# Patient Record
Sex: FEMALE | Race: WHITE | Employment: UNEMPLOYED | URBAN - METROPOLITAN AREA
[De-identification: names, ages, dates, MRNs, and addresses within clinical notes are randomized per-mention and may not be internally consistent; named-entity substitution may affect disease eponyms.]

---
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
## 2023-02-07 ENCOUNTER — TELEPHONE (OUTPATIENT)
Dept: FAMILY MEDICINE CLINIC | Age: 18
End: 2023-02-07

## 2023-02-07 NOTE — TELEPHONE ENCOUNTER
----- Message from Izzy Gail sent at 2/7/2023  2:28 PM EST -----  Subject: Referral Request    Reason for referral request? wants vitamin deficiency and hormone test  Provider patient wants to be referred to(if known):     Provider Phone Number(if known):    Additional Information for Provider?   ---------------------------------------------------------------------------  --------------  CALL BACK INFO    [phone]; OK to leave message on voicemail  ---------------------------------------------------------------------------  --------------

## 2023-02-09 ENCOUNTER — TELEMEDICINE (OUTPATIENT)
Dept: FAMILY MEDICINE CLINIC | Age: 18
End: 2023-02-09
Payer: COMMERCIAL

## 2023-02-09 DIAGNOSIS — R53.83 OTHER FATIGUE: Primary | ICD-10-CM

## 2023-02-09 DIAGNOSIS — L70.0 ACNE VULGARIS: ICD-10-CM

## 2023-02-09 DIAGNOSIS — N92.6 MENSTRUAL PROBLEM: ICD-10-CM

## 2023-02-09 PROCEDURE — 99213 OFFICE O/P EST LOW 20 MIN: CPT | Performed by: FAMILY MEDICINE

## 2023-02-09 PROCEDURE — G8427 DOCREV CUR MEDS BY ELIG CLIN: HCPCS | Performed by: FAMILY MEDICINE

## 2023-02-09 PROCEDURE — 36415 COLL VENOUS BLD VENIPUNCTURE: CPT | Performed by: FAMILY MEDICINE

## 2023-02-09 SDOH — ECONOMIC STABILITY: FOOD INSECURITY: WITHIN THE PAST 12 MONTHS, YOU WORRIED THAT YOUR FOOD WOULD RUN OUT BEFORE YOU GOT MONEY TO BUY MORE.: NEVER TRUE

## 2023-02-09 SDOH — ECONOMIC STABILITY: HOUSING INSECURITY
IN THE LAST 12 MONTHS, WAS THERE A TIME WHEN YOU DID NOT HAVE A STEADY PLACE TO SLEEP OR SLEPT IN A SHELTER (INCLUDING NOW)?: NO

## 2023-02-09 SDOH — ECONOMIC STABILITY: INCOME INSECURITY: HOW HARD IS IT FOR YOU TO PAY FOR THE VERY BASICS LIKE FOOD, HOUSING, MEDICAL CARE, AND HEATING?: NOT HARD AT ALL

## 2023-02-09 SDOH — ECONOMIC STABILITY: FOOD INSECURITY: WITHIN THE PAST 12 MONTHS, THE FOOD YOU BOUGHT JUST DIDN'T LAST AND YOU DIDN'T HAVE MONEY TO GET MORE.: NEVER TRUE

## 2023-02-09 ASSESSMENT — PATIENT HEALTH QUESTIONNAIRE - PHQ9
SUM OF ALL RESPONSES TO PHQ9 QUESTIONS 1 & 2: 2
6. FEELING BAD ABOUT YOURSELF - OR THAT YOU ARE A FAILURE OR HAVE LET YOURSELF OR YOUR FAMILY DOWN: 0
10. IF YOU CHECKED OFF ANY PROBLEMS, HOW DIFFICULT HAVE THESE PROBLEMS MADE IT FOR YOU TO DO YOUR WORK, TAKE CARE OF THINGS AT HOME, OR GET ALONG WITH OTHER PEOPLE: 0
4. FEELING TIRED OR HAVING LITTLE ENERGY: 3
1. LITTLE INTEREST OR PLEASURE IN DOING THINGS: 1
5. POOR APPETITE OR OVEREATING: 3
SUM OF ALL RESPONSES TO PHQ QUESTIONS 1-9: 11
8. MOVING OR SPEAKING SO SLOWLY THAT OTHER PEOPLE COULD HAVE NOTICED. OR THE OPPOSITE, BEING SO FIGETY OR RESTLESS THAT YOU HAVE BEEN MOVING AROUND A LOT MORE THAN USUAL: 0
SUM OF ALL RESPONSES TO PHQ QUESTIONS 1-9: 11
9. THOUGHTS THAT YOU WOULD BE BETTER OFF DEAD, OR OF HURTING YOURSELF: 0
SUM OF ALL RESPONSES TO PHQ QUESTIONS 1-9: 11
SUM OF ALL RESPONSES TO PHQ QUESTIONS 1-9: 11
3. TROUBLE FALLING OR STAYING ASLEEP: 3
2. FEELING DOWN, DEPRESSED OR HOPELESS: 1
7. TROUBLE CONCENTRATING ON THINGS, SUCH AS READING THE NEWSPAPER OR WATCHING TELEVISION: 0

## 2023-02-09 ASSESSMENT — ENCOUNTER SYMPTOMS
VOMITING: 0
NAUSEA: 0

## 2023-02-09 NOTE — PROGRESS NOTES
Subjective:      Patient ID: Janna Quevedo is a 17 y.o. female.    Janna Quevedo, was evaluated through a synchronous (real-time) audio-video encounter. The patient (or guardian if applicable) is aware that this is a billable service, which includes applicable co-pays. This Virtual Visit was conducted with patient's (and/or legal guardian's) consent. The visit was conducted pursuant to the emergency declaration under the [employer] Act and the National Emergencies Act, 1135 waiver authority and the Coronavirus Preparedness and Response Supplemental Appropriations Act.  Patient identification was verified, and a caregiver was present when appropriate.   The patient was located at Home: [address] [address]  Provider was located at Facility (Appt Dept): [address] [place] [address], [address]  [place] [address]         Total time spent for this encounter: Not billed by time    --Madeleine Kilpatrick MD on 2/9/2023 at 3:04 PM    An electronic signature was used to authenticate this note.    Other  This is a chronic (abnormal menses, fatigue, acne) problem. The current episode started more than 1 year ago. The problem occurs intermittently. Associated symptoms include fatigue. Pertinent negatives include no nausea, urinary symptoms, vomiting or weakness. Nothing aggravates the symptoms. Treatments tried: bcp that helped with her menses but stopped medication on Nov.     Onset of menses: 13 yo   LMP: Jan 14 or so   Sexually active with one male he uses condoms     Acne:   Benzoyl peroxide and tretinoin   Not helping     Review of Systems   Constitutional:  Positive for fatigue.   Gastrointestinal:  Negative for nausea and vomiting.   Neurological:  Negative for weakness.     Objective:   Physical Exam  Vitals and nursing note reviewed.   Constitutional:       General: She is not in acute distress.     Appearance: Normal appearance. She is not ill-appearing, toxic-appearing or diaphoretic.   HENT:      Head:  Normocephalic and atraumatic. Pulmonary:      Effort: No respiratory distress. Musculoskeletal:      Cervical back: Normal range of motion. Neurological:      General: No focal deficit present. Mental Status: She is alert and oriented to person, place, and time. Mental status is at baseline. Psychiatric:         Mood and Affect: Mood normal.         Behavior: Behavior normal.       Assessment:      .   Diagnosis Orders   1. Other fatigue  Vitamin D 25 Hydroxy    Comprehensive Metabolic Panel      2. Menstrual problem  Estradiol    Progesterone    Testosterone    TSH    Comprehensive Metabolic Panel    HCG, Quantitative, Pregnancy      3. Acne vulgaris                Plan:      Sec to depression (screening PHQ-9=11)? Check labs  Check blood work,   She would like to try Accutane , she has a dermatologist , I explained to her that if she starts that treatment she will need to resume bcp.      Rudy woodn           Sayra Rios MD

## 2023-06-20 LAB
25(OH)D3 SERPL-MCNC: 62.7 NG/ML
ALBUMIN SERPL-MCNC: 4.7 G/DL (ref 3.4–5)
ALBUMIN/GLOB SERPL: 1.7 {RATIO} (ref 1.1–2.2)
ALP SERPL-CCNC: 49 U/L (ref 40–129)
ALT SERPL-CCNC: 15 U/L (ref 10–40)
ANION GAP SERPL CALCULATED.3IONS-SCNC: 13 MMOL/L (ref 3–16)
AST SERPL-CCNC: 18 U/L (ref 15–37)
B-HCG SERPL EIA 3RD IS-ACNC: <5 MIU/ML
BILIRUB SERPL-MCNC: 0.6 MG/DL (ref 0–1)
BUN SERPL-MCNC: 15 MG/DL (ref 7–20)
CALCIUM SERPL-MCNC: 9.5 MG/DL (ref 8.3–10.6)
CHLORIDE SERPL-SCNC: 97 MMOL/L (ref 99–110)
CO2 SERPL-SCNC: 21 MMOL/L (ref 21–32)
CREAT SERPL-MCNC: 0.9 MG/DL (ref 0.6–1.1)
ESTRADIOL SERPL-MCNC: 251 PG/ML
GFR SERPLBLD CREATININE-BSD FMLA CKD-EPI: >60 ML/MIN/{1.73_M2}
GLUCOSE SERPL-MCNC: 84 MG/DL (ref 70–99)
POTASSIUM SERPL-SCNC: 4 MMOL/L (ref 3.5–5.1)
PROGEST SERPL-MCNC: 12.37 NG/ML
PROT SERPL-MCNC: 7.5 G/DL (ref 6.4–8.2)
SODIUM SERPL-SCNC: 131 MMOL/L (ref 136–145)
TSH SERPL DL<=0.005 MIU/L-ACNC: 0.53 UIU/ML (ref 0.27–4.2)

## 2023-06-21 LAB — TESTOST SERPL-MCNC: 57 NG/DL (ref 20–70)

## 2023-06-22 ENCOUNTER — TELEPHONE (OUTPATIENT)
Dept: FAMILY MEDICINE CLINIC | Age: 18
End: 2023-06-22

## 2023-06-22 DIAGNOSIS — N92.6 ABNORMAL MENSES: Primary | ICD-10-CM

## 2023-06-22 NOTE — TELEPHONE ENCOUNTER
----- Message from Patti Momin sent at 6/22/2023  9:52 AM EDT -----  Subject: Appointment Request    Reason for Call: Established Patient Appointment needed: Routine (Patient   Request) No Script    QUESTIONS    Reason for appointment request? No appointments available during search     Additional Information for Provider? patient wants appt with [facility] but   she is only showing VV.  Patient is having abnormal menstrual cycle and she   is having issues with acne   ---------------------------------------------------------------------------  --------------  Sylvia DELA CRUZ  [phone]; OK to leave message on voicemail  ---------------------------------------------------------------------------  --------------  SCRIPT ANSWERS

## 2023-06-23 DIAGNOSIS — N92.6 ABNORMAL MENSES: Primary | ICD-10-CM

## 2023-07-07 DIAGNOSIS — N92.6 ABNORMAL MENSES: Primary | ICD-10-CM

## 2023-07-21 ENCOUNTER — OFFICE VISIT (OUTPATIENT)
Dept: FAMILY MEDICINE CLINIC | Age: 18
End: 2023-07-21
Payer: COMMERCIAL

## 2023-07-21 VITALS
SYSTOLIC BLOOD PRESSURE: 102 MMHG | OXYGEN SATURATION: 96 % | WEIGHT: 115.6 LBS | HEIGHT: 61 IN | DIASTOLIC BLOOD PRESSURE: 60 MMHG | BODY MASS INDEX: 21.83 KG/M2 | HEART RATE: 84 BPM | RESPIRATION RATE: 16 BRPM | TEMPERATURE: 98.2 F

## 2023-07-21 DIAGNOSIS — E87.1 HYPONATREMIA: ICD-10-CM

## 2023-07-21 DIAGNOSIS — N92.6 ABNORMAL MENSES: ICD-10-CM

## 2023-07-21 DIAGNOSIS — L70.0 ACNE CYSTICA: Primary | ICD-10-CM

## 2023-07-21 DIAGNOSIS — M54.50 ACUTE BILATERAL LOW BACK PAIN WITHOUT SCIATICA: ICD-10-CM

## 2023-07-21 PROCEDURE — 99214 OFFICE O/P EST MOD 30 MIN: CPT | Performed by: FAMILY MEDICINE

## 2023-07-21 PROCEDURE — G8420 CALC BMI NORM PARAMETERS: HCPCS | Performed by: FAMILY MEDICINE

## 2023-07-21 PROCEDURE — 1036F TOBACCO NON-USER: CPT | Performed by: FAMILY MEDICINE

## 2023-07-21 PROCEDURE — G8427 DOCREV CUR MEDS BY ELIG CLIN: HCPCS | Performed by: FAMILY MEDICINE

## 2023-07-21 RX ORDER — SPIRONOLACTONE 50 MG/1
50 TABLET, FILM COATED ORAL DAILY
Qty: 30 TABLET | Refills: 2 | Status: SHIPPED | OUTPATIENT
Start: 2023-07-21

## 2023-07-21 RX ORDER — NORETHINDRONE AND ETHINYL ESTRADIOL 7 DAYS X 3
1 KIT ORAL DAILY
Qty: 84 TABLET | Refills: 3 | Status: SHIPPED | OUTPATIENT
Start: 2023-07-21

## 2023-07-21 ASSESSMENT — PATIENT HEALTH QUESTIONNAIRE - PHQ9
SUM OF ALL RESPONSES TO PHQ QUESTIONS 1-9: 0
3. TROUBLE FALLING OR STAYING ASLEEP: 0
SUM OF ALL RESPONSES TO PHQ QUESTIONS 1-9: 1
4. FEELING TIRED OR HAVING LITTLE ENERGY: 1
SUM OF ALL RESPONSES TO PHQ QUESTIONS 1-9: 1
SUM OF ALL RESPONSES TO PHQ QUESTIONS 1-9: 1
6. FEELING BAD ABOUT YOURSELF - OR THAT YOU ARE A FAILURE OR HAVE LET YOURSELF OR YOUR FAMILY DOWN: 0
1. LITTLE INTEREST OR PLEASURE IN DOING THINGS: 0
SUM OF ALL RESPONSES TO PHQ9 QUESTIONS 1 & 2: 0
2. FEELING DOWN, DEPRESSED OR HOPELESS: 0
10. IF YOU CHECKED OFF ANY PROBLEMS, HOW DIFFICULT HAVE THESE PROBLEMS MADE IT FOR YOU TO DO YOUR WORK, TAKE CARE OF THINGS AT HOME, OR GET ALONG WITH OTHER PEOPLE: 0
SUM OF ALL RESPONSES TO PHQ9 QUESTIONS 1 & 2: 0
SUM OF ALL RESPONSES TO PHQ QUESTIONS 1-9: 0
SUM OF ALL RESPONSES TO PHQ QUESTIONS 1-9: 0
8. MOVING OR SPEAKING SO SLOWLY THAT OTHER PEOPLE COULD HAVE NOTICED. OR THE OPPOSITE, BEING SO FIGETY OR RESTLESS THAT YOU HAVE BEEN MOVING AROUND A LOT MORE THAN USUAL: 0
1. LITTLE INTEREST OR PLEASURE IN DOING THINGS: 0
2. FEELING DOWN, DEPRESSED OR HOPELESS: 0
7. TROUBLE CONCENTRATING ON THINGS, SUCH AS READING THE NEWSPAPER OR WATCHING TELEVISION: 0
SUM OF ALL RESPONSES TO PHQ QUESTIONS 1-9: 1
SUM OF ALL RESPONSES TO PHQ QUESTIONS 1-9: 0
9. THOUGHTS THAT YOU WOULD BE BETTER OFF DEAD, OR OF HURTING YOURSELF: 0
5. POOR APPETITE OR OVEREATING: 0

## 2023-07-24 ASSESSMENT — ENCOUNTER SYMPTOMS
BACK PAIN: 1
ABDOMINAL PAIN: 0
VOMITING: 0
BOWEL INCONTINENCE: 0

## 2023-08-12 DIAGNOSIS — L70.0 ACNE CYSTICA: ICD-10-CM

## 2023-08-14 NOTE — TELEPHONE ENCOUNTER
Medication:   Requested Prescriptions     Pending Prescriptions Disp Refills    spironolactone (ALDACTONE) 50 MG tablet [Pharmacy Med Name: SPIRONOLACTONE 50 MG TABLET] 30 tablet 2     Sig: TAKE 1 TABLET BY MOUTH EVERY DAY        Last Filled:      Patient Phone Number: [phone] (home)     Last appt: 7/21/2023   Next appt: Visit date not found    Last OARRS:   RX Monitoring 2/22/2018   Attestation The Prescription Monitoring Report for this patient was reviewed today. Periodic Controlled Substance Monitoring No signs of potential drug abuse or diversion identified. ;Possible medication side effects, risk of tolerance/dependence & alternative treatments discussed.

## 2023-08-15 RX ORDER — SPIRONOLACTONE 50 MG/1
TABLET, FILM COATED ORAL
Qty: 30 TABLET | Refills: 2 | Status: SHIPPED | OUTPATIENT
Start: 2023-08-15

## 2023-08-22 DIAGNOSIS — L70.0 ACNE CYSTICA: ICD-10-CM

## 2023-08-22 NOTE — TELEPHONE ENCOUNTER
Medication:   Requested Prescriptions     Pending Prescriptions Disp Refills    spironolactone (ALDACTONE) 50 MG tablet 30 tablet 2     Sig: Take 1 tablet by mouth daily        Last Filled:      Patient Phone Number: [phone] (home)     Last appt: 7/21/2023   Next appt: 9/11/2023    Last OARRS:   RX Monitoring 2/22/2018   Attestation The Prescription Monitoring Report for this patient was reviewed today. Periodic Controlled Substance Monitoring No signs of potential drug abuse or diversion identified. ;Possible medication side effects, risk of tolerance/dependence & alternative treatments discussed.

## 2023-08-22 NOTE — TELEPHONE ENCOUNTER
----- Message from Enedina Ma sent at 8/22/2023 11:57 AM EDT -----  Subject: Refill Request    QUESTIONS  Name of Medication? spironolactone (ALDACTONE) 50 MG tablet  Patient-reported dosage and instructions? TAKE 1 TABLET BY MOUTH EVERY DAY  How many days do you have left? 1  Preferred Pharmacy? [id number]  Pharmacy phone number (if available)? [phone]  Additional Information for Provider? Pt has 1 day left for this med needs   to be refilled. Last visit 07/21/23 next visit 09/11/23  ---------------------------------------------------------------------------  --------------  CALL BACK INFO  What is the best way for the office to contact you? OK to leave message on   voicemail,Do not leave any message, patient will call back for answer  Preferred Call Back Phone Number? [phone]  ---------------------------------------------------------------------------  --------------  SCRIPT ANSWERS  Relationship to Patient?  Self

## 2023-08-23 RX ORDER — SPIRONOLACTONE 50 MG/1
50 TABLET, FILM COATED ORAL DAILY
Qty: 30 TABLET | Refills: 2 | Status: SHIPPED | OUTPATIENT
Start: 2023-08-23

## 2023-09-24 ENCOUNTER — HOSPITAL ENCOUNTER (EMERGENCY)
Age: 18
Discharge: HOME | End: 2023-09-24
Payer: COMMERCIAL

## 2023-09-24 ENCOUNTER — APPOINTMENT (OUTPATIENT)
Dept: GENERAL RADIOLOGY | Age: 18
End: 2023-09-24
Payer: COMMERCIAL

## 2023-09-24 VITALS
HEART RATE: 72 BPM | DIASTOLIC BLOOD PRESSURE: 73 MMHG | BODY MASS INDEX: 20.77 KG/M2 | RESPIRATION RATE: 14 BRPM | TEMPERATURE: 98.9 F | SYSTOLIC BLOOD PRESSURE: 108 MMHG | WEIGHT: 110 LBS | OXYGEN SATURATION: 97 % | HEIGHT: 61 IN

## 2023-09-24 DIAGNOSIS — S60.211A CONTUSION OF RIGHT WRIST, INITIAL ENCOUNTER: Primary | ICD-10-CM

## 2023-09-24 PROCEDURE — 73110 X-RAY EXAM OF WRIST: CPT

## 2023-09-24 PROCEDURE — 6370000000 HC RX 637 (ALT 250 FOR IP): Performed by: PHYSICIAN ASSISTANT

## 2023-09-24 PROCEDURE — 99283 EMERGENCY DEPT VISIT LOW MDM: CPT

## 2023-09-24 RX ORDER — IBUPROFEN 800 MG/1
800 TABLET ORAL ONCE
Status: COMPLETED | OUTPATIENT
Start: 2023-09-24 | End: 2023-09-24

## 2023-09-24 RX ADMIN — IBUPROFEN 800 MG: 800 TABLET, FILM COATED ORAL at 14:26

## 2023-09-24 ASSESSMENT — ENCOUNTER SYMPTOMS
DIARRHEA: 0
ABDOMINAL PAIN: 0
NAUSEA: 0
COUGH: 0
WHEEZING: 0
RHINORRHEA: 0
VOMITING: 0
SHORTNESS OF BREATH: 0

## 2023-09-24 ASSESSMENT — PAIN - FUNCTIONAL ASSESSMENT: PAIN_FUNCTIONAL_ASSESSMENT: 0-10

## 2023-09-24 ASSESSMENT — PAIN DESCRIPTION - ORIENTATION: ORIENTATION: RIGHT

## 2023-09-24 ASSESSMENT — LIFESTYLE VARIABLES
HOW MANY STANDARD DRINKS CONTAINING ALCOHOL DO YOU HAVE ON A TYPICAL DAY: 1 OR 2
HOW OFTEN DO YOU HAVE A DRINK CONTAINING ALCOHOL: MONTHLY OR LESS

## 2023-09-24 ASSESSMENT — PAIN SCALES - GENERAL: PAINLEVEL_OUTOF10: 7

## 2023-09-24 ASSESSMENT — PAIN DESCRIPTION - PAIN TYPE: TYPE: ACUTE PAIN

## 2023-09-24 ASSESSMENT — PAIN DESCRIPTION - LOCATION: LOCATION: WRIST

## 2023-09-24 NOTE — ED NOTES
Ace wrap applied and discharge instructions reviewed. Pt ambulated to lobby to leave on own.  No further questions      Stephanie Alicea RN  09/24/23 [id number]

## 2023-09-24 NOTE — ED PROVIDER NOTES
Vasyl Lizzie        Pt Name: Marcin Garsia  MRN: [id number]  [address] 2005  Date of evaluation: 9/24/2023  Provider: Samie Councilman, PA-C  PCP: Usman Sanchez MD  Note Started: 2:12 PM EDT 9/24/23      VARGHESE. I have evaluated this patient. CHIEF COMPLAINT       Chief Complaint   Patient presents with    Wrist Injury     Pt w c/o R wrist pain (7/10) after mechanical fall yesterday. HISTORY OF PRESENT ILLNESS: 1 or more Elements     History From: patient   Limitations to history : None    Marcin Garsia is a 19 y.o. female who presents for evaluation of right wrist injury. Patient states that she was intoxicated yesterday evening and does not remember the actual events of the injury. States that she woke up with pain and swelling and bruising to the ulnar aspect of her right wrist.  She denies numbness tingling or weakness distally. Skin is intact. No other injuries or complaints at this time. Nursing Notes were all reviewed and agreed with or any disagreements were addressed in the HPI. REVIEW OF SYSTEMS :      Review of Systems   Constitutional:  Negative for appetite change, chills and fever. HENT:  Negative for congestion and rhinorrhea. Respiratory:  Negative for cough, shortness of breath and wheezing. Cardiovascular:  Negative for chest pain. Gastrointestinal:  Negative for abdominal pain, diarrhea, nausea and vomiting. Genitourinary:  Negative for difficulty urinating, dysuria and hematuria. Musculoskeletal:  Positive for arthralgias (R wrist). Negative for neck pain and neck stiffness. Skin:  Negative for rash. Neurological:  Negative for weakness, numbness and headaches. Positives and Pertinent negatives as per HPI. SURGICAL HISTORY   History reviewed. No pertinent surgical history.     CURRENTMEDICATIONS       Previous Medications    NORETHINDRONE-ETHINYL ESTRADIOL (Leeanne Vidal 7/7/7)

## 2023-11-14 ENCOUNTER — OFFICE VISIT (OUTPATIENT)
Dept: FAMILY MEDICINE CLINIC | Age: 18
End: 2023-11-14

## 2023-11-14 VITALS
HEIGHT: 61 IN | DIASTOLIC BLOOD PRESSURE: 60 MMHG | RESPIRATION RATE: 16 BRPM | WEIGHT: 119.7 LBS | OXYGEN SATURATION: 98 % | HEART RATE: 87 BPM | SYSTOLIC BLOOD PRESSURE: 100 MMHG | TEMPERATURE: 97 F | BODY MASS INDEX: 22.6 KG/M2

## 2023-11-14 DIAGNOSIS — M54.50 CHRONIC BILATERAL LOW BACK PAIN WITHOUT SCIATICA: ICD-10-CM

## 2023-11-14 DIAGNOSIS — Z00.00 ENCOUNTER FOR WELL ADULT EXAM WITHOUT ABNORMAL FINDINGS: Primary | ICD-10-CM

## 2023-11-14 DIAGNOSIS — H10.13 ALLERGIC CONJUNCTIVITIS OF BOTH EYES: ICD-10-CM

## 2023-11-14 DIAGNOSIS — L70.0 ACNE VULGARIS: ICD-10-CM

## 2023-11-14 DIAGNOSIS — G89.29 CHRONIC BILATERAL LOW BACK PAIN WITHOUT SCIATICA: ICD-10-CM

## 2023-11-14 DIAGNOSIS — J34.9 SINUS PROBLEM: ICD-10-CM

## 2023-11-14 RX ORDER — CETIRIZINE HYDROCHLORIDE 10 MG/1
10 TABLET ORAL DAILY
Qty: 30 TABLET | Refills: 0 | Status: SHIPPED | OUTPATIENT
Start: 2023-11-14 | End: 2023-12-14

## 2023-11-14 RX ORDER — SPIRONOLACTONE 50 MG/1
50 TABLET, FILM COATED ORAL DAILY
Qty: 30 TABLET | Refills: 2 | Status: SHIPPED | OUTPATIENT
Start: 2023-11-14

## 2023-11-14 ASSESSMENT — ENCOUNTER SYMPTOMS
NAUSEA: 0
EYE REDNESS: 0
EYE ITCHING: 0
WHEEZING: 0
CHEST TIGHTNESS: 0
TROUBLE SWALLOWING: 0
VOMITING: 0
SHORTNESS OF BREATH: 0
RHINORRHEA: 0
ABDOMINAL PAIN: 0

## 2023-11-14 NOTE — PROGRESS NOTES
Well Adult Note  Name: Spencer Hannon Date: 2023   MRN: [id number] Sex: Female   Age: 19 y.o. Ethnicity: Non- / Non    : 2005 Race: White (non-)      Harley Valadez is here for well adult exam.  History:  Well Adult Physical   Patient here for a comprehensive physical exam.The patient reports problems -   Acne:   Stopped Aldactone, needs refills, this provided some relief      Back pain  Onset  recurrent , 2 years   Progression: worsening   Quality aching,   Radiation to hips   Severity:7/10   Timing: daytime  Worse : standing,   Better: lying flat on back   Xr history: none  PT: none     Eye problem:   Itchy, watery, red eyes for days     Sinus congestion, no pain . Denies: fever, chills, cough, wheezing,   No sick contacts. Do you take any herbs or supplements that were not prescribed by a doctor? no Are you taking calcium supplements? no Are you taking aspirin daily? no   History:  Any STD's in the past? Not previously screened         Review of Systems   Constitutional:  Negative for activity change, appetite change, fatigue, fever and unexpected weight change. HENT:  Negative for congestion, postnasal drip, rhinorrhea and trouble swallowing. Eyes:  Negative for redness and itching. Respiratory:  Negative for chest tightness, shortness of breath and wheezing. Cardiovascular:  Negative for chest pain and palpitations. Gastrointestinal:  Negative for abdominal pain, nausea and vomiting. Endocrine: Negative for polydipsia, polyphagia and polyuria. Genitourinary:  Negative for dysuria and urgency. Musculoskeletal:  Negative for arthralgias, joint swelling, myalgias and neck pain. Skin:  Negative for rash. Neurological:  Negative for light-headedness and headaches. Hematological:  Negative for adenopathy. Psychiatric/Behavioral:  The patient is not nervous/anxious.         No Known Allergies      Prior to Visit Medications    Medication Sig

## 2023-11-28 ENCOUNTER — TELEPHONE (OUTPATIENT)
Dept: FAMILY MEDICINE CLINIC | Age: 18
End: 2023-11-28

## 2023-11-28 NOTE — TELEPHONE ENCOUNTER
I doubt is secondary to vaccines since sec effects usually last at the most for 72 hours     For her current sx she should come in for evaluation , ok db with me if needed

## 2023-11-28 NOTE — TELEPHONE ENCOUNTER
Patient has been feeling bad for the last 4 weeks, she had HPV and Flu vaccine on 11/14/2023, She feels bloated and swollen and excessively tired and has had a headache for for the last 7 days.... Patient would like to know if this could be a side effect from the Flu vaccine or the HPV, what course of action should she take if any?      Please advise:     Patient is not feeling well and ask that we call her Mother Sereinty Lawton [phone]    Last office visit: 11/14/2023

## 2023-12-11 DIAGNOSIS — H10.13 ALLERGIC CONJUNCTIVITIS OF BOTH EYES: ICD-10-CM

## 2023-12-11 RX ORDER — CETIRIZINE HYDROCHLORIDE 10 MG/1
10 TABLET ORAL DAILY
Qty: 90 TABLET | Refills: 0 | Status: SHIPPED | OUTPATIENT
Start: 2023-12-11 | End: 2024-03-10

## 2023-12-11 NOTE — TELEPHONE ENCOUNTER
Medication:   Requested Prescriptions     Pending Prescriptions Disp Refills    cetirizine (ZYRTEC) 10 MG tablet 90 tablet 0     Sig: Take 1 tablet by mouth daily        Last Filled:      Patient Phone Number: [phone] (home)     Last appt: 11/14/2023   Next appt: Visit date not found    Last OARRS:       2/22/2018     2:30 PM   RX Monitoring   Attestation The Prescription Monitoring Report for this patient was reviewed today. Periodic Controlled Substance Monitoring No signs of potential drug abuse or diversion identified. ;Possible medication side effects, risk of tolerance/dependence & alternative treatments discussed.

## 2024-01-16 ENCOUNTER — OFFICE VISIT (OUTPATIENT)
Age: 19
End: 2024-01-16

## 2024-01-16 VITALS
HEIGHT: 61 IN | BODY MASS INDEX: 22.47 KG/M2 | OXYGEN SATURATION: 98 % | WEIGHT: 119 LBS | TEMPERATURE: 98.2 F | HEART RATE: 63 BPM | RESPIRATION RATE: 17 BRPM | SYSTOLIC BLOOD PRESSURE: 118 MMHG | DIASTOLIC BLOOD PRESSURE: 72 MMHG

## 2024-01-16 DIAGNOSIS — J02.9 PHARYNGITIS, UNSPECIFIED ETIOLOGY: Primary | ICD-10-CM

## 2024-01-16 RX ORDER — AMOXICILLIN 875 MG/1
875 TABLET, COATED ORAL 2 TIMES DAILY
Qty: 20 TABLET | Refills: 0 | Status: SHIPPED | OUTPATIENT
Start: 2024-01-16 | End: 2024-01-26

## 2024-01-16 ASSESSMENT — ENCOUNTER SYMPTOMS
RESPIRATORY NEGATIVE: 1
SORE THROAT: 1
GASTROINTESTINAL NEGATIVE: 1
EYES NEGATIVE: 1

## 2024-01-16 NOTE — PATIENT INSTRUCTIONS
Discussed symptomatic treatments: Cepacol lozenges, salt water gargles, cold drinks to ease sore throat.

## 2024-01-16 NOTE — PROGRESS NOTES
Janna Quevedo (:  2005) is a 18 y.o. female,New patient, here for evaluation of the following chief complaint(s):  Pharyngitis      ASSESSMENT/PLAN:    ICD-10-CM    1. Pharyngitis, unspecified etiology  J02.9 amoxicillin (AMOXIL) 875 MG tablet        Discussed symptomatic treatments: Cepacol lozenges, salt water gargles, cold drinks to ease sore throat.   Follow up in 2 days if symptoms persist or if symptoms worsen.    SUBJECTIVE/OBJECTIVE:    Pharyngitis  Associated symptoms: sore throat      HPI:   18 y.o. female presents with symptoms of sore throat ongoing since 3 days. Denies fever, cough or congestion. Has taken nothing for symptoms.    Vitals:    24 1815   BP: 118/72   Pulse: 63   Resp: 17   Temp: 98.2 °F (36.8 °C)   SpO2: 98%   Weight: 54 kg (119 lb)   Height: 1.549 m (5' 1\")       Review of Systems   Constitutional: Negative.    HENT:  Positive for sore throat.    Eyes: Negative.    Respiratory: Negative.     Cardiovascular: Negative.    Gastrointestinal: Negative.    Genitourinary: Negative.  Negative for difficulty urinating, dysuria, flank pain, frequency, genital sores and hematuria.   Musculoskeletal: Negative.    Skin: Negative.    Neurological: Negative.    Psychiatric/Behavioral: Negative.     All other systems reviewed and are negative.      Physical Exam  Vitals and nursing note reviewed.   Constitutional:       Appearance: Normal appearance.   HENT:      Head: Normocephalic.      Right Ear: Tympanic membrane normal.      Left Ear: Tympanic membrane normal.      Nose: No congestion.      Mouth/Throat:      Pharynx: Oropharyngeal exudate and posterior oropharyngeal erythema present.   Eyes:      Conjunctiva/sclera: Conjunctivae normal.      Pupils: Pupils are equal, round, and reactive to light.   Cardiovascular:      Rate and Rhythm: Normal rate and regular rhythm.      Pulses: Normal pulses.   Pulmonary:      Effort: Pulmonary effort is normal.      Breath sounds: Normal breath

## 2024-01-18 ENCOUNTER — OFFICE VISIT (OUTPATIENT)
Dept: FAMILY MEDICINE CLINIC | Age: 19
End: 2024-01-18
Payer: COMMERCIAL

## 2024-01-18 VITALS
DIASTOLIC BLOOD PRESSURE: 60 MMHG | HEART RATE: 82 BPM | OXYGEN SATURATION: 99 % | SYSTOLIC BLOOD PRESSURE: 90 MMHG | WEIGHT: 113.2 LBS | BODY MASS INDEX: 21.37 KG/M2 | HEIGHT: 61 IN | TEMPERATURE: 98.1 F

## 2024-01-18 DIAGNOSIS — R21 RASH: ICD-10-CM

## 2024-01-18 DIAGNOSIS — N92.6 ABNORMAL MENSES: Primary | ICD-10-CM

## 2024-01-18 PROCEDURE — 4004F PT TOBACCO SCREEN RCVD TLK: CPT | Performed by: NURSE PRACTITIONER

## 2024-01-18 PROCEDURE — 99213 OFFICE O/P EST LOW 20 MIN: CPT | Performed by: NURSE PRACTITIONER

## 2024-01-18 PROCEDURE — G8482 FLU IMMUNIZE ORDER/ADMIN: HCPCS | Performed by: NURSE PRACTITIONER

## 2024-01-18 PROCEDURE — G8420 CALC BMI NORM PARAMETERS: HCPCS | Performed by: NURSE PRACTITIONER

## 2024-01-18 PROCEDURE — G8427 DOCREV CUR MEDS BY ELIG CLIN: HCPCS | Performed by: NURSE PRACTITIONER

## 2024-01-18 RX ORDER — PREDNISONE 20 MG/1
20 TABLET ORAL DAILY
Qty: 7 TABLET | Refills: 0 | Status: SHIPPED | OUTPATIENT
Start: 2024-01-18 | End: 2024-01-25

## 2024-01-18 ASSESSMENT — PATIENT HEALTH QUESTIONNAIRE - PHQ9
5. POOR APPETITE OR OVEREATING: 0
SUM OF ALL RESPONSES TO PHQ9 QUESTIONS 1 & 2: 0
SUM OF ALL RESPONSES TO PHQ QUESTIONS 1-9: 6
1. LITTLE INTEREST OR PLEASURE IN DOING THINGS: 0
7. TROUBLE CONCENTRATING ON THINGS, SUCH AS READING THE NEWSPAPER OR WATCHING TELEVISION: 0
SUM OF ALL RESPONSES TO PHQ QUESTIONS 1-9: 6
10. IF YOU CHECKED OFF ANY PROBLEMS, HOW DIFFICULT HAVE THESE PROBLEMS MADE IT FOR YOU TO DO YOUR WORK, TAKE CARE OF THINGS AT HOME, OR GET ALONG WITH OTHER PEOPLE: 0
SUM OF ALL RESPONSES TO PHQ QUESTIONS 1-9: 6
8. MOVING OR SPEAKING SO SLOWLY THAT OTHER PEOPLE COULD HAVE NOTICED. OR THE OPPOSITE, BEING SO FIGETY OR RESTLESS THAT YOU HAVE BEEN MOVING AROUND A LOT MORE THAN USUAL: 0
4. FEELING TIRED OR HAVING LITTLE ENERGY: 3
3. TROUBLE FALLING OR STAYING ASLEEP: 3
SUM OF ALL RESPONSES TO PHQ QUESTIONS 1-9: 6
9. THOUGHTS THAT YOU WOULD BE BETTER OFF DEAD, OR OF HURTING YOURSELF: 0
6. FEELING BAD ABOUT YOURSELF - OR THAT YOU ARE A FAILURE OR HAVE LET YOURSELF OR YOUR FAMILY DOWN: 0
2. FEELING DOWN, DEPRESSED OR HOPELESS: 0

## 2024-01-18 NOTE — PROGRESS NOTES
Janna Quevedo (:  2005) is a 18 y.o. female,Established patient, here for evaluation of the following chief complaint(s):  Other (Skin itching on legs ankles knees elbows )      ASSESSMENT/PLAN:  1. Abnormal menses  Assessment & Plan:   Can we recheck labs today, recommend patient schedule follow-up with OB/GYN for further eval  Orders:  -     Comprehensive Metabolic Panel; Future  -     TSH with Reflex to FT4; Future  -     CBC with Auto Differential; Future  -     Estradiol; Future  2. Rash  Assessment & Plan:   Will start course of steroids recommend to continue antibiotics at this time can continue antihistamines at home  Orders:  -     predniSONE (DELTASONE) 20 MG tablet; Take 1 tablet by mouth daily for 7 days, Disp-7 tablet, R-0Normal      There are no Patient Instructions on file for this visit.     Return in about 1 week (around 2024).    SUBJECTIVE/OBJECTIVE:  HPI  Patient seen for rash that started about a week ago patient was seen at urgent care and started on amoxicillin for possible strep, patient says he did not do throat culture.  Rapid strep was negative.  However is improving on medication, rash has not worsened since starting antibiotics.    Also took concern with abnormal menses which is chronic, patient is established with OB/GYN but has not seen them recently.  Discussed previous labs ordered for workup and recommendations for referral to [facility].  Patient states she would rather follow-up with her OB/GYN she is already established with.  Rash  This is a new problem. The current episode started 1 to 4 weeks ago. The problem has been gradually improving since onset. The affected locations include the neck, torso, chest, back, left arm and right arm. The rash is characterized by itchiness and redness. She was exposed to nothing. Associated symptoms include a sore throat. Pertinent negatives include no anorexia, congestion, cough, diarrhea, eye pain, facial edema, fatigue,

## 2024-01-23 PROBLEM — N92.6 ABNORMAL MENSES: Status: ACTIVE | Noted: 2024-01-23

## 2024-01-23 PROBLEM — R21 RASH: Status: ACTIVE | Noted: 2024-01-23

## 2024-01-23 ASSESSMENT — ENCOUNTER SYMPTOMS
DIARRHEA: 0
VOMITING: 0
COUGH: 0
EYE PAIN: 0
SHORTNESS OF BREATH: 0
SORE THROAT: 1
RHINORRHEA: 0
NAIL CHANGES: 0

## 2024-01-23 NOTE — ASSESSMENT & PLAN NOTE
Will start course of steroids recommend to continue antibiotics at this time can continue antihistamines at home

## 2024-01-30 ENCOUNTER — TELEPHONE (OUTPATIENT)
Dept: FAMILY MEDICINE CLINIC | Age: 19
End: 2024-01-30

## 2024-10-03 ENCOUNTER — TELEMEDICINE (OUTPATIENT)
Dept: FAMILY MEDICINE CLINIC | Age: 19
End: 2024-10-03
Payer: COMMERCIAL

## 2024-10-03 DIAGNOSIS — J40 BRONCHITIS: Primary | ICD-10-CM

## 2024-10-03 PROCEDURE — G8428 CUR MEDS NOT DOCUMENT: HCPCS | Performed by: FAMILY MEDICINE

## 2024-10-03 PROCEDURE — 99213 OFFICE O/P EST LOW 20 MIN: CPT | Performed by: FAMILY MEDICINE

## 2024-10-03 RX ORDER — AZITHROMYCIN 250 MG/1
TABLET, FILM COATED ORAL
Qty: 6 TABLET | Refills: 0 | Status: SHIPPED | OUTPATIENT
Start: 2024-10-03 | End: 2024-10-13

## 2024-10-03 RX ORDER — BENZONATATE 200 MG/1
200 CAPSULE ORAL 3 TIMES DAILY PRN
Qty: 30 CAPSULE | Refills: 0 | Status: SHIPPED | OUTPATIENT
Start: 2024-10-03 | End: 2024-10-13

## 2024-10-03 ASSESSMENT — ENCOUNTER SYMPTOMS
SORE THROAT: 0
SHORTNESS OF BREATH: 1
WHEEZING: 1
RHINORRHEA: 0
COUGH: 1
HEMOPTYSIS: 0
HEARTBURN: 0

## 2024-10-03 NOTE — PROGRESS NOTES
Janna Quevedo, was evaluated through a synchronous (real-time) audio-video encounter. The patient (or guardian if applicable) is aware that this is a billable service, which includes applicable co-pays. This Virtual Visit was conducted with patient's (and/or legal guardian's) consent. Patient identification was verified, and a caregiver was present when appropriate.   The patient was located at Other: parked car in [place]   Provider was located at Facility (Appt Dept): [address] [place] [address], [address]  [place] [address]  Confirm you are appropriately licensed, registered, or certified to deliver care in the state where the patient is located as indicated above. If you are not or unsure, please re-schedule the visit: Yes, I confirm.     Janna Quevedo (:  2005) is a Established patient, presenting virtually for evaluation of the following:      Below is the assessment and plan developed based on review of pertinent history, physical exam, labs, studies, and medications.     Assessment & Plan  Bronchitis   With occasional wheezing and mild sob but not on acute distress during this virtual visit   Tx with :     Orders:    azithromycin (ZITHROMAX) 250 MG tablet; 500mg on day 1 followed by 250mg on days 2 - 5    benzonatate (TESSALON) 200 MG capsule; Take 1 capsule by mouth 3 times daily as needed for Cough    Patient to call if symptoms persist or worsen.      Fu 1 week prn          Subjective   Cough  This is a new problem. Episode onset: 2 weeks, The problem has been gradually worsening. The problem occurs every few minutes. The cough is Non-productive. Associated symptoms include chest pain, shortness of breath (occ) and wheezing (occ). Pertinent negatives include no chills, ear congestion, ear pain, fever, headaches, heartburn, hemoptysis, myalgias, nasal congestion, postnasal drip, rash, rhinorrhea, sore throat, sweats or weight loss. Nothing aggravates the symptoms. Risk factors: none. She has

## 2024-10-16 ENCOUNTER — OFFICE VISIT (OUTPATIENT)
Dept: FAMILY MEDICINE CLINIC | Age: 19
End: 2024-10-16
Payer: COMMERCIAL

## 2024-10-16 VITALS
HEART RATE: 71 BPM | WEIGHT: 114 LBS | BODY MASS INDEX: 21.52 KG/M2 | HEIGHT: 61 IN | TEMPERATURE: 97 F | OXYGEN SATURATION: 97 % | DIASTOLIC BLOOD PRESSURE: 60 MMHG | SYSTOLIC BLOOD PRESSURE: 100 MMHG

## 2024-10-16 DIAGNOSIS — R05.1 ACUTE COUGH: Primary | ICD-10-CM

## 2024-10-16 DIAGNOSIS — R06.02 SOB (SHORTNESS OF BREATH): ICD-10-CM

## 2024-10-16 DIAGNOSIS — J01.90 ACUTE BACTERIAL SINUSITIS: ICD-10-CM

## 2024-10-16 DIAGNOSIS — R06.2 WHEEZING: ICD-10-CM

## 2024-10-16 DIAGNOSIS — B96.89 ACUTE BACTERIAL SINUSITIS: ICD-10-CM

## 2024-10-16 PROBLEM — Z72.89 DELIBERATE SELF-CUTTING: Status: RESOLVED | Noted: 2018-02-22 | Resolved: 2024-10-16

## 2024-10-16 PROBLEM — N92.6 ABNORMAL MENSES: Status: RESOLVED | Noted: 2024-01-23 | Resolved: 2024-10-16

## 2024-10-16 PROBLEM — R21 RASH: Status: RESOLVED | Noted: 2024-01-23 | Resolved: 2024-10-16

## 2024-10-16 PROBLEM — F32.2 SEVERE SINGLE CURRENT EPISODE OF MAJOR DEPRESSIVE DISORDER, WITHOUT PSYCHOTIC FEATURES (HCC): Status: RESOLVED | Noted: 2018-02-22 | Resolved: 2024-10-16

## 2024-10-16 PROCEDURE — 99214 OFFICE O/P EST MOD 30 MIN: CPT | Performed by: FAMILY MEDICINE

## 2024-10-16 PROCEDURE — G8427 DOCREV CUR MEDS BY ELIG CLIN: HCPCS | Performed by: FAMILY MEDICINE

## 2024-10-16 PROCEDURE — G8484 FLU IMMUNIZE NO ADMIN: HCPCS | Performed by: FAMILY MEDICINE

## 2024-10-16 PROCEDURE — 4004F PT TOBACCO SCREEN RCVD TLK: CPT | Performed by: FAMILY MEDICINE

## 2024-10-16 PROCEDURE — G8420 CALC BMI NORM PARAMETERS: HCPCS | Performed by: FAMILY MEDICINE

## 2024-10-16 RX ORDER — ALBUTEROL SULFATE 90 UG/1
2 INHALANT RESPIRATORY (INHALATION) EVERY 6 HOURS PRN
Qty: 18 G | Refills: 3 | Status: SHIPPED | OUTPATIENT
Start: 2024-10-16

## 2024-10-16 RX ORDER — AMOXICILLIN 875 MG
875 TABLET ORAL 2 TIMES DAILY
Qty: 20 TABLET | Refills: 0 | Status: SHIPPED | OUTPATIENT
Start: 2024-10-16 | End: 2024-10-26

## 2024-10-16 RX ORDER — MEDROXYPROGESTERONE ACETATE 5 MG
5 TABLET ORAL DAILY
COMMUNITY
Start: 2024-09-17

## 2024-10-16 RX ORDER — PREDNISONE 10 MG/1
10 TABLET ORAL 2 TIMES DAILY
Qty: 10 TABLET | Refills: 0 | Status: SHIPPED | OUTPATIENT
Start: 2024-10-16 | End: 2024-10-21

## 2024-10-16 NOTE — PROGRESS NOTES
Janna Quevedo (:  2005) is a 19 y.o. female,Established patient, here for evaluation of the following chief complaint(s):  Shortness of Breath (Just feels worse than before. ), Cough (Stated that she has coughing spells at night that cause her to throw up. ), Epistaxis, Fatigue, and Ear Problem (Stated that she feels pain or pressure behind her ears as well. )         Assessment & Plan  Acute cough   + wheezing on exam   encourage her to stop vaping, she is not ready, counseling   Get XR     Orders:    XR CHEST (2 VW); Future    SOB (shortness of breath)   Positive wheezing on exam   Get XR   Tx with oral steroid and albuterol PRN       Orders:    XR CHEST (2 VW); Future    Acute bacterial sinusitis   Positive congestion and pain   Tx with amoxicillin   Flonase     Orders:    amoxicillin (AMOXIL) 875 MG tablet; Take 1 tablet by mouth 2 times daily for 10 days    Wheezing   Above     Orders:    predniSONE (DELTASONE) 10 MG tablet; Take 1 tablet by mouth 2 times daily for 5 days    albuterol sulfate HFA (PROVENTIL HFA) 108 (90 Base) MCG/ACT inhaler; Inhale 2 puffs into the lungs every 6 hours as needed for Wheezing      Fu 1 week         Subjective   Cough  This is a new problem. Episode onset: > 2 weeks. The problem has been unchanged. The problem occurs every few minutes. The cough is Non-productive. Associated symptoms include chest pain, chills, ear congestion, ear pain, headaches, nasal congestion, shortness of breath and wheezing. Pertinent negatives include no fever, heartburn, hemoptysis, myalgias, postnasal drip, rash, rhinorrhea, sore throat, sweats or weight loss. Nothing aggravates the symptoms. Risk factors: vaping. She has tried OTC cough suppressant for the symptoms. The treatment provided no relief. Her past medical history is significant for environmental allergies. There is no history of asthma, bronchitis or emphysema.   Shortness of Breath  This is a new problem. The current episode

## 2024-10-17 ASSESSMENT — ENCOUNTER SYMPTOMS
SHORTNESS OF BREATH: 1
VOMITING: 0
COUGH: 1
RHINORRHEA: 0
HOARSE VOICE: 0
SINUS PRESSURE: 1
SWOLLEN GLANDS: 0
HEARTBURN: 0
WHEEZING: 1
SPUTUM PRODUCTION: 1
HEMOPTYSIS: 0
SORE THROAT: 0
ORTHOPNEA: 0

## 2024-10-18 ENCOUNTER — HOSPITAL ENCOUNTER (OUTPATIENT)
Age: 19
Discharge: HOME | End: 2024-10-18
Payer: COMMERCIAL

## 2024-10-18 ENCOUNTER — HOSPITAL ENCOUNTER (OUTPATIENT)
Dept: GENERAL RADIOLOGY | Age: 19
Discharge: HOME | End: 2024-10-18
Payer: COMMERCIAL

## 2024-10-18 DIAGNOSIS — R05.1 ACUTE COUGH: ICD-10-CM

## 2024-10-18 DIAGNOSIS — R06.02 SOB (SHORTNESS OF BREATH): ICD-10-CM

## 2024-10-18 PROCEDURE — 71046 X-RAY EXAM CHEST 2 VIEWS: CPT

## 2025-07-12 ENCOUNTER — APPOINTMENT (OUTPATIENT)
Dept: ULTRASOUND IMAGING | Age: 20
End: 2025-07-12
Payer: COMMERCIAL

## 2025-07-12 ENCOUNTER — HOSPITAL ENCOUNTER (EMERGENCY)
Age: 20
Discharge: HOME | End: 2025-07-12
Attending: STUDENT IN AN ORGANIZED HEALTH CARE EDUCATION/TRAINING PROGRAM
Payer: COMMERCIAL

## 2025-07-12 VITALS
DIASTOLIC BLOOD PRESSURE: 64 MMHG | WEIGHT: 112.6 LBS | OXYGEN SATURATION: 95 % | SYSTOLIC BLOOD PRESSURE: 98 MMHG | HEART RATE: 66 BPM | TEMPERATURE: 98.8 F | BODY MASS INDEX: 21.26 KG/M2 | HEIGHT: 61 IN | RESPIRATION RATE: 16 BRPM

## 2025-07-12 DIAGNOSIS — R10.2 PELVIC PAIN: Primary | ICD-10-CM

## 2025-07-12 LAB
ALBUMIN SERPL-MCNC: 4.3 G/DL (ref 3.4–5)
ALBUMIN/GLOB SERPL: 1.4 {RATIO} (ref 1.1–2.2)
ALP SERPL-CCNC: 42 U/L (ref 40–129)
ALT SERPL-CCNC: 13 U/L (ref 10–40)
ANION GAP SERPL CALCULATED.3IONS-SCNC: 9 MMOL/L (ref 3–16)
AST SERPL-CCNC: 22 U/L (ref 15–37)
B-HCG SERPL EIA 3RD IS-ACNC: <5 MIU/ML
BACTERIA URNS QL MICRO: NORMAL /HPF
BASOPHILS # BLD: 0.1 K/UL (ref 0–0.2)
BASOPHILS NFR BLD: 0.8 %
BILIRUB SERPL-MCNC: 0.4 MG/DL (ref 0–1)
BILIRUB UR QL STRIP.AUTO: NEGATIVE
BUN SERPL-MCNC: 17 MG/DL (ref 7–20)
CALCIUM SERPL-MCNC: 9.3 MG/DL (ref 8.3–10.6)
CHLORIDE SERPL-SCNC: 104 MMOL/L (ref 99–110)
CLARITY UR: ABNORMAL
CO2 SERPL-SCNC: 25 MMOL/L (ref 21–32)
COLOR UR: YELLOW
CREAT SERPL-MCNC: 1.1 MG/DL (ref 0.6–1.1)
DEPRECATED RDW RBC AUTO: 13 % (ref 12.4–15.4)
EOSINOPHIL # BLD: 0.2 K/UL (ref 0–0.6)
EOSINOPHIL NFR BLD: 2.1 %
EPI CELLS #/AREA URNS AUTO: 3 /HPF (ref 0–5)
GFR SERPLBLD CREATININE-BSD FMLA CKD-EPI: 73 ML/MIN/{1.73_M2}
GLUCOSE SERPL-MCNC: 79 MG/DL (ref 70–99)
GLUCOSE UR STRIP.AUTO-MCNC: NEGATIVE MG/DL
HCT VFR BLD AUTO: 36 % (ref 36–48)
HGB BLD-MCNC: 12.5 G/DL (ref 12–16)
HGB UR QL STRIP.AUTO: NEGATIVE
HYALINE CASTS #/AREA URNS AUTO: 0 /LPF (ref 0–8)
KETONES UR STRIP.AUTO-MCNC: ABNORMAL MG/DL
LEUKOCYTE ESTERASE UR QL STRIP.AUTO: NEGATIVE
LYMPHOCYTES # BLD: 3.1 K/UL (ref 1–5.1)
LYMPHOCYTES NFR BLD: 38.9 %
MCH RBC QN AUTO: 31.3 PG (ref 26–34)
MCHC RBC AUTO-ENTMCNC: 34.9 G/DL (ref 31–36)
MCV RBC AUTO: 89.7 FL (ref 80–100)
MONOCYTES # BLD: 0.7 K/UL (ref 0–1.3)
MONOCYTES NFR BLD: 9.1 %
NEUTROPHILS # BLD: 3.9 K/UL (ref 1.7–7.7)
NEUTROPHILS NFR BLD: 49.1 %
NITRITE UR QL STRIP.AUTO: NEGATIVE
PH UR STRIP.AUTO: 7.5 [PH] (ref 5–8)
PLATELET # BLD AUTO: 244 K/UL (ref 135–450)
PMV BLD AUTO: 7.7 FL (ref 5–10.5)
POTASSIUM SERPL-SCNC: 4.3 MMOL/L (ref 3.5–5.1)
PROT SERPL-MCNC: 7.3 G/DL (ref 6.4–8.2)
PROT UR STRIP.AUTO-MCNC: 30 MG/DL
RBC # BLD AUTO: 4.01 M/UL (ref 4–5.2)
RBC CLUMPS #/AREA URNS AUTO: 2 /HPF (ref 0–4)
SODIUM SERPL-SCNC: 138 MMOL/L (ref 136–145)
SP GR UR STRIP.AUTO: >=1.03 (ref 1–1.03)
UA COMPLETE W REFLEX CULTURE PNL UR: ABNORMAL
UA DIPSTICK W REFLEX MICRO PNL UR: YES
URN SPEC COLLECT METH UR: ABNORMAL
UROBILINOGEN UR STRIP-ACNC: 1 E.U./DL
WBC # BLD AUTO: 7.9 K/UL (ref 4–11)
WBC #/AREA URNS AUTO: 1 /HPF (ref 0–5)

## 2025-07-12 PROCEDURE — 6360000002 HC RX W HCPCS: Performed by: PHYSICIAN ASSISTANT

## 2025-07-12 PROCEDURE — 84702 CHORIONIC GONADOTROPIN TEST: CPT

## 2025-07-12 PROCEDURE — 80053 COMPREHEN METABOLIC PANEL: CPT

## 2025-07-12 PROCEDURE — 85025 COMPLETE CBC W/AUTO DIFF WBC: CPT

## 2025-07-12 PROCEDURE — 76830 TRANSVAGINAL US NON-OB: CPT

## 2025-07-12 PROCEDURE — 96374 THER/PROPH/DIAG INJ IV PUSH: CPT

## 2025-07-12 PROCEDURE — 99284 EMERGENCY DEPT VISIT MOD MDM: CPT

## 2025-07-12 PROCEDURE — 93975 VASCULAR STUDY: CPT

## 2025-07-12 PROCEDURE — 81001 URINALYSIS AUTO W/SCOPE: CPT

## 2025-07-12 RX ORDER — KETOROLAC TROMETHAMINE 30 MG/ML
30 INJECTION, SOLUTION INTRAMUSCULAR; INTRAVENOUS ONCE
Status: COMPLETED | OUTPATIENT
Start: 2025-07-12 | End: 2025-07-12

## 2025-07-12 RX ADMIN — KETOROLAC TROMETHAMINE 30 MG: 30 INJECTION, SOLUTION INTRAMUSCULAR at 01:51

## 2025-07-12 ASSESSMENT — ENCOUNTER SYMPTOMS
NAUSEA: 0
RHINORRHEA: 0
DIARRHEA: 0
COUGH: 0
ABDOMINAL PAIN: 1
SHORTNESS OF BREATH: 0
VOMITING: 0

## 2025-07-12 ASSESSMENT — LIFESTYLE VARIABLES
HOW OFTEN DO YOU HAVE A DRINK CONTAINING ALCOHOL: MONTHLY OR LESS
HOW MANY STANDARD DRINKS CONTAINING ALCOHOL DO YOU HAVE ON A TYPICAL DAY: 1 OR 2

## 2025-07-12 ASSESSMENT — PAIN SCALES - GENERAL
PAINLEVEL_OUTOF10: 3
PAINLEVEL_OUTOF10: 10

## 2025-07-12 ASSESSMENT — PAIN - FUNCTIONAL ASSESSMENT: PAIN_FUNCTIONAL_ASSESSMENT: 0-10

## 2025-07-12 ASSESSMENT — PAIN DESCRIPTION - LOCATION: LOCATION: ABDOMEN

## 2025-07-12 ASSESSMENT — PAIN DESCRIPTION - PAIN TYPE: TYPE: ACUTE PAIN

## 2025-07-12 ASSESSMENT — PAIN DESCRIPTION - ORIENTATION: ORIENTATION: LOWER

## 2025-07-12 NOTE — DISCHARGE INSTRUCTIONS
It is mandatory that you follow up with your primary care provider and gynecologist to ensure resolution/improvement of your symptoms.    Please see your discharge paperwork for information to contact [facility] with gynecology.  Alternatively, please schedule an appointment with a specialists of this field with whom you have an existing relationship.    MANDATORY return to the emergency department within 12-24 hours unless you are better.  If you feel worse or have any other concerns, return sooner.    If you experience any of the red flag signs/symptoms that we discussed, please return to the emergency department or call 911 immediately.    Please take medications as we discussed.  
5

## 2025-07-12 NOTE — ED PROVIDER NOTES
EMERGENCY DEPARTMENT PROVIDER NOTE         PATIENT IDENTIFICATION     Name:   Janna Quevedo  MRN:   [id number]  YOB: 2005  Date of Evaluation:   7/12/2025  Provider:   CRISTOFER Izaguirre; Alejandro Alarcon DO  PCP:   Madeleine Kilpatrick MD        CHIEF COMPLAINT     Abdominal Pain (Pt w c/o lower abd cramping. Pt stated previous miscarriage )        HISTORY OF PRESENT ILLNESS     I independently interviewed patient and/or caretaker(s).  See Advanced Practice Provider (VARGHESE) note for full HPI.  In summary, Janna Quevedo  is a(n) 20 y.o. female who presents with left lower pelvic/abdominal cramping that has been intermittent in nature.  States that her will come on strong for several minutes and then spontaneously resolved.  Confirms history of ovarian cyst in the past.        PHYSICAL EXAM     I reviewed physical exam performed and documented by VARGHESE.  I performed an independent physical examination with findings as follows:  After patient received pain medications, well-appearing.  Minimal tenderness to palpation of the left pelvic region.        LAB RESULTS     Results for orders placed or performed during the hospital encounter of 07/12/25   CBC with Auto Differential   Result Value Ref Range    WBC 7.9 4.0 - 11.0 K/uL    RBC 4.01 4.00 - 5.20 M/uL    Hemoglobin 12.5 12.0 - 16.0 g/dL    Hematocrit 36.0 36.0 - 48.0 %    MCV 89.7 80.0 - 100.0 fL    MCH 31.3 26.0 - 34.0 pg    MCHC 34.9 31.0 - 36.0 g/dL    RDW 13.0 12.4 - 15.4 %    Platelets 244 135 - 450 K/uL    MPV 7.7 5.0 - 10.5 fL    Neutrophils % 49.1 %    Lymphocytes % 38.9 %    Monocytes % 9.1 %    Eosinophils % 2.1 %    Basophils % 0.8 %    Neutrophils Absolute 3.9 1.7 - 7.7 K/uL    Lymphocytes Absolute 3.1 1.0 - 5.1 K/uL    Monocytes Absolute 0.7 0.0 - 1.3 K/uL    Eosinophils Absolute 0.2 0.0 - 0.6 K/uL    Basophils Absolute 0.1 0.0 - 0.2 K/uL   Comprehensive Metabolic Panel w/ Reflex to MG   Result Value Ref Range    Sodium 138 136 - 145 mmol/L  [[id number]],[[id number]]

## 2025-07-12 NOTE — ED PROVIDER NOTES
[facility] EMERGENCY DEPARTMENT  EMERGENCY DEPARTMENT ENCOUNTER        Pt Name: Janna Quevedo  MRN: [id number]  Birthdate 2005  Date of evaluation: 7/12/2025  Provider: Janna Rubi PA-C  PCP: Madeleine Kilpatrick MD  Note Started: 2:56 AM EDT 7/12/25       I have seen and evaluated this patient with my supervising physician Alejandro Alarcon DO.      CHIEF COMPLAINT       Chief Complaint   Patient presents with    Abdominal Pain     Pt w c/o lower abd cramping. Pt stated previous miscarriage        HISTORY OF PRESENT ILLNESS: 1 or more Elements     History From: Patient  Limitations to history : None    Janna Quevedo is a 20 y.o. female who presents to the emergency department today for evaluation for concerns of lower abdominal cramping.  The patient reports that she does have a history of an ovarian cyst on the left.  She reports that she has had some intermittent abdominal pains however tonight she feels that the pain will come on strong, will last for several minutes and then will resolve.  She reports that this seems to be progressively worsening, and she had to leave work, which prompted her visit to the ED.  She reports that her pain is to her left lower abdomen and otherwise does not radiate.  She denies any vaginal discharge or bleeding.  She has no concern for STDs.  She reports that she has had a previous miscarriage in the past and she reports that the cramping currently does feel similar to when she had that cramping.  Patient reports that her last medical cycle was on 6/10/2025.  She is nauseous but denies any vomiting or diarrhea.  States that she did feel lightheaded when the pain came on strong but denies feeling dizzy or lightheaded.  She reports that since arriving to the ED the pain seems to be improving on its own and is currently rating as a 3/10.  She has no diarrhea.  No other complaints    Nursing Notes were all reviewed and agreed with or any disagreements were addressed  read by the radiologist.       Interpretation per the Radiologist below, if available at the time of this note:    US NON OB TRANSVAGINAL    (Results Pending)   US DUP ABD PEL RETRO SCROT COMPLETE    (Results Pending)     No results found.      ED Provider US Interpretation.    No results found.    PROCEDURES   Unless otherwise noted below, none     Procedures      CRITICAL CARE TIME (.cctime)       PAST MEDICAL HISTORY      has a past medical history of Abnormal menses (01/23/2024), Anxiety, Closed fracture of thigh ([facility]), Deliberate self-cutting (02/22/2018), Hand, foot and mouth disease, and Severe single current episode of major depressive disorder, without psychotic features ([facility]) (02/22/2018).     EMERGENCY DEPARTMENT COURSE and DIFFERENTIAL DIAGNOSIS/MDM:   Vitals:    Vitals:    07/12/25 0121   BP: 98/64   Pulse: 66   Resp: 16   Temp: 98.8 °F (37.1 °C)   TempSrc: Oral   SpO2: 95%   Weight: 51.1 kg (112 lb 9.6 oz)   Height: 1.549 m (5' 1\")       Is this patient to be included in the SEP-1 Core Measure due to severe sepsis or septic shock?   No   Exclusion criteria - the patient is NOT to be included for SEP-1 Core Measure due to:  Infection is not suspected    Patient was given the following medications:  Medications   ketorolac (TORADOL) injection 30 mg (30 mg IntraVENous Given 7/12/25 0151)       ED Course as of 07/12/25 0356   Sat Jul 12, 2025   0205 WBC: 7.9 [PB]   0205 Hemoglobin Quant: 12.5 [PB]   0346 Potassium: 4.3 [PB]   0346 Creatinine: 1.1 [PB]   0346 Total Bilirubin: 0.4 [PB]   0346 Alkaline Phosphatase: 42 [PB]   0346 ALT: 13 [PB]   0346 AST: 22 [PB]   0346 HCG, Beta: <5.0 [PB]   0354 Patient is visited bedside.  Remains in stable condition.  States that her pain is fully resolved.  She feels well to return home, and she wants to do so.  We had a long discussion regarding my concern for ovarian torsion with the radiologist report of the ultrasound pending.  Patient verbalizes understanding of this